# Patient Record
Sex: MALE | HISPANIC OR LATINO | Employment: FULL TIME | URBAN - METROPOLITAN AREA
[De-identification: names, ages, dates, MRNs, and addresses within clinical notes are randomized per-mention and may not be internally consistent; named-entity substitution may affect disease eponyms.]

---

## 2019-10-03 ENCOUNTER — OFFICE VISIT (OUTPATIENT)
Dept: FAMILY MEDICINE | Facility: CLINIC | Age: 24
End: 2019-10-03
Payer: COMMERCIAL

## 2019-10-03 VITALS
HEART RATE: 88 BPM | BODY MASS INDEX: 31.57 KG/M2 | SYSTOLIC BLOOD PRESSURE: 120 MMHG | WEIGHT: 208.3 LBS | HEIGHT: 68 IN | OXYGEN SATURATION: 96 % | TEMPERATURE: 99.3 F | DIASTOLIC BLOOD PRESSURE: 73 MMHG

## 2019-10-03 DIAGNOSIS — Z23 NEED FOR PROPHYLACTIC VACCINATION AND INOCULATION AGAINST INFLUENZA: ICD-10-CM

## 2019-10-03 DIAGNOSIS — Z13.220 LIPID SCREENING: ICD-10-CM

## 2019-10-03 DIAGNOSIS — E66.811 CLASS 1 OBESITY WITHOUT SERIOUS COMORBIDITY WITH BODY MASS INDEX (BMI) OF 33.0 TO 33.9 IN ADULT, UNSPECIFIED OBESITY TYPE: ICD-10-CM

## 2019-10-03 DIAGNOSIS — Z02.5 SPORTS PHYSICAL: ICD-10-CM

## 2019-10-03 DIAGNOSIS — Z00.00 ROUTINE HISTORY AND PHYSICAL EXAMINATION OF ADULT: Primary | ICD-10-CM

## 2019-10-03 PROCEDURE — 90686 IIV4 VACC NO PRSV 0.5 ML IM: CPT | Performed by: INTERNAL MEDICINE

## 2019-10-03 PROCEDURE — 99385 PREV VISIT NEW AGE 18-39: CPT | Mod: 25 | Performed by: INTERNAL MEDICINE

## 2019-10-03 PROCEDURE — 90471 IMMUNIZATION ADMIN: CPT | Performed by: INTERNAL MEDICINE

## 2019-10-03 PROCEDURE — 99213 OFFICE O/P EST LOW 20 MIN: CPT | Mod: 25 | Performed by: INTERNAL MEDICINE

## 2019-10-03 SDOH — HEALTH STABILITY: MENTAL HEALTH: HOW OFTEN DO YOU HAVE A DRINK CONTAINING ALCOHOL?: NEVER

## 2019-10-03 ASSESSMENT — MIFFLIN-ST. JEOR: SCORE: 1919.34

## 2019-10-03 NOTE — LETTER
[facility]     [address] [facility]  [address]  [place]  [address]      October 4, 2019    Jorge Mario Alberto Oliveira  [address]    [facility] [address]            To whom it may concern:    Mr. Oliveira was seen in our clinic on October 3rd 2019 for a physical exam      Sincerely,    Dr. Luis Angel Gaspar

## 2019-10-03 NOTE — PROGRESS NOTES
SUBJECTIVE:   CC: Yehuda Oliveira is an 24 year old male who presents for preventative health visit.   Healthy Habits:    Getting at least 3 servings of Calcium per day:  Yes    Bi-annual eye exam:  Yes    Dental care twice a year:  Yes    Sleep apnea or symptoms of sleep apnea:  None    Diet:  Regular (no restrictions)    Frequency of exercise:  2-3 days/week    Duration of exercise:  15-30 minutes    Taking medications regularly:  Not Applicable    Barriers to taking medications:  Not applicable    Medication side effects:  Not applicable    PHQ-2 Total Score:    Additional concerns today:  No        Today's PHQ-2 Score:   PHQ-2 ( 1999 [employer]) 10/3/2019   Q1: Little interest or pleasure in doing things 0   Q2: Feeling down, depressed or hopeless 0   PHQ-2 Score 0       Abuse: Current or Past(Physical, Sexual or Emotional)- No  Do you feel safe in your environment? Yes    Social History     Tobacco Use     Smoking status: Never Smoker     Smokeless tobacco: Never Used   Substance Use Topics     Alcohol use: Never     Frequency: Never     Comment: social     If you drink alcohol do you typically have >3 drinks per day or >7 drinks per week? Never drinks alcohol    No flowsheet data found.    Last PSA: No results found for: PSA    Reviewed orders with patient. Reviewed health maintenance and updated orders accordingly - Yes  Lab work is in process  Labs reviewed in EPIC  BP Readings from Last 3 Encounters:   10/03/19 120/73    Wt Readings from Last 3 Encounters:   10/03/19 94.5 kg (208 lb 4.8 oz)                  Patient Active Problem List   Diagnosis     Class 1 obesity without serious comorbidity with body mass index (BMI) of 33.0 to 33.9 in adult, unspecified obesity type     History reviewed. No pertinent surgical history.    Social History     Tobacco Use     Smoking status: Never Smoker     Smokeless tobacco: Never Used   Substance Use Topics     Alcohol use: Never     Frequency: Never     Comment:  "social     History reviewed. No pertinent family history.      No current outpatient medications on file.     No Known Allergies  No lab results found.     Reviewed and updated as needed this visit by clinical staff  Tobacco  Allergies  Meds  Problems  Med Hx  Surg Hx  Fam Hx  Soc Hx          Reviewed and updated as needed this visit by Provider  Allergies  Meds  Problems  Med Hx  Surg Hx  Fam Hx        History reviewed. No pertinent past medical history.   History reviewed. No pertinent surgical history.    Review of Systems  CONSTITUTIONAL: NEGATIVE for fever, chills, change in weight.  BMI in obesity category.  INTEGUMENTARY/SKIN: NEGATIVE for worrisome rashes, moles or lesions  EYES: NEGATIVE for vision changes or irritation  ENT: NEGATIVE for ear, mouth and throat problems  RESP: NEGATIVE for significant cough or SOB. No apnea that patient is aware of . No history of asthma as per patient.  CV: NEGATIVE for chest pain, palpitations or peripheral edema  GI: NEGATIVE for nausea, abdominal pain, heartburn, or change in bowel habits   male: negative for dysuria, hematuria, decreased urinary stream, erectile dysfunction, urethral discharge  MUSCULOSKELETAL: NEGATIVE for significant arthralgias or myalgia  NEURO: NEGATIVE for weakness, dizziness or paresthesias  ENDOCRINE: NEGATIVE for temperature intolerance, skin/hair changes  HEME/ALLERGY/IMMUNE: NEGATIVE for bleeding problems  PSYCHIATRIC: NEGATIVE for changes in mood or affect    OBJECTIVE:   /73 (BP Location: Right arm, Patient Position: Sitting, Cuff Size: Adult Regular)   Pulse 88   Temp 99.3  F (37.4  C) (Tympanic)   Ht 1.727 m (5' 8\")   Wt 94.5 kg (208 lb 4.8 oz)   SpO2 96%   BMI 31.67 kg/m      Physical Exam  GENERAL: healthy, alert and no distress, obese.  EYES: Eyes grossly normal to inspection, PERRL and conjunctivae and sclerae normal  HENT: ear canals and TM's normal, nose and mouth without ulcers or lesions  NECK: no " adenopathy, no asymmetry, masses, or scars and thyroid normal to palpation  RESP: lungs clear to auscultation - no rales, rhonchi or wheezes  CV: regular rate and rhythm, normal S1 S2, no S3 or S4, no murmur, click or rub, no peripheral edema and peripheral pulses strong  ABDOMEN: soft, nontender, no hepatosplenomegaly, no masses and bowel sounds normal  MS: no gross musculoskeletal defects noted, no edema. No scoliosis, no Joint deformities.  SKIN: no suspicious lesions or rashes and large pigmented nevus on proximal right arm without joint involvement. No striae. NO ACANTHOSIS  NEURO: Normal strength and tone, mentation intact and speech normal. Negative cerebellar signs.  PSYCH: mentation appears normal, affect normal, good eye contact.    Diagnostic Test Results:  Labs reviewed in Epic    ASSESSMENT/PLAN:   Yehuda was seen today for physical, imm/inj and imm/inj.    Diagnoses and all orders for this visit:    Routine history and physical examination of adult  Comments:  Preventative physical exam  Orders:  -     Cancel: Lipid panel reflex to direct LDL Fasting  -     Cancel: CBC with platelets  -     **CBC with platelets FUTURE 1yr; Future    Class 1 obesity without serious comorbidity with body mass index (BMI) of 33.0 to 33.9 in adult, unspecified obesity type  Comments:  Concern for Physical deconditioning  Orders:  -     Cancel: Comprehensive metabolic panel (BMP + Alb, Alk Phos, ALT, AST, Total. Bili, TP)  -     Cancel: TSH  -     **CBC with platelets FUTURE 1yr; Future    Need for prophylactic vaccination and inoculation against influenza  -     INFLUENZA VACCINE IM > 6 MONTHS VALENT IIV4 [80332]  -     Vaccine Administration, Initial [[id number]]    Lipid screening  -     Cancel: Lipid panel reflex to direct LDL Fasting    Sports physical  Comments:  Referral to Sports Medicine  Orders:  -     SPORTS MEDICINE REFERRAL; Future    At end of visit patient requested that we complete a Form which is outside the  characteristics of this visit (which is a preventive physical exam); Form is from [facility] referenced as The Form for 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  After reviewing the Form and requirements; advised the patient that this visit is scheduled as a physical preventative visit which is outside the characteristics of such an evaluation for enrollment into this program, AND the patient should undergo some formal of standardized Physical Fittness Testing and/or Physical Abilities Testing (PAT) , That is a form of Police Physical Abilities testing that is commonly required for applicants enrolling in Law Enforcement, such physical abilities testing includes evaluation such as 1.5 mile run, the police obstacle course, strengths assessment ..etc,  Advised patient that it required to PASS such a standardized physical fitness test. Some departments use the physical ability test (PAT), which includes as mentioned testing fitness in events such as running, push-ups, sit-ups, and sit and reach. Others might use the physical qualification test (PQT) which typically involves more of an obstacle course graded challenge that measures a wide range of physical abilities necessary of police work.    Regardless of which test he undergoes or the department [he is applying to] at [facility] uses as the standard, basic physical fitness must be displayed by a candidate. Exercises such as sprinting, running (two miles or more), vertical jumps, and strength training are highly recommended to become familiar and comfortable with. Should patient have passed such testing, he should bring results of such tests to the office for this provider to review . Again advised patient that such a Formal Physical Fitness testing or evaluation is  beyond the characteristics of this preventative visit and that this provider does not do such an evaluation.  We advised patient to call the [facility] [employer] [facility] [employer] [facility] and or see Sports Medicine (referral was put)  and ask if they can do such an assessment / evaluation or check with his College he is applying to, if they have a certain Program that will assess the students Physical Abilities Testing prior to enrollment. Also suggested to have him evaluated by Physical Therapy / occupational therapy to see if they do such an assessment [we can put a referral if patient wishes to].     I am concerned that the patient BMI 34 is in the obesity category and that he does not indulge in adequate/appropriate physical/exercise activities and possibility of some physical deconditioning.  Advised patient that part of his preventative physical exam, I recommend he does further  fasting blood tests/labs including lipid panel, fasting blood sugar, liver function tests, and thyroid test. Counseled patient on importance of life style changes, increase exercise activities and importance of weight loss and Healthy diet.    The Form for Professional Licensing CORE Program in Law Enforcement/Physician's Approval Form/for [facility] states if applicant is admitted to the program, the student will be required to participate in vigorous physical activities which include running and jumping over, under and around obstacles, as well as practicing a variety of prisoner restraint techniques. Also The student will be involved in technical aerobics, strength training, flexibility movements and climbing activities.  The student will participate in a number of simulated police activities including firing semi-auto pistols distals and shotguns, driving evasively, running, chasing and wrestling with resisting prisoners, standing for long periods of time, and lifting heavy objects. The students also will be exposed to  "chemical mace and outdoor elements and will be simulated with a T.A.S.E.R.  [place] law requires that [facility] NOT admit into the law enforcement program individuals who  pose a serious threat to the health or safety of themselves or others. Because of this requirement you have been asked to evaluate the physical fitness of the applicant to make a recommendation regarding their physical fitness for the program.  Form requires physician attests that nothing in the exam results indicate that the applicant participation in the group program will pose a serious threat to the health and safety of the applicant or other students and staff of the program.  That the applicant is physically fit to safely participate in the low enforcement program or is not physically capable of participation in low enforcement program with comments.        COUNSELING:   Reviewed preventive health counseling, as reflected in patient instructions       Regular exercise       Healthy diet/nutrition       Vision screening       Hearing screening       Immunizations -flu       Safe sex practices/STD prevention       HIV screeninx in teen years, 1x in adult years, and at intervals if high risk    Estimated body mass index is 31.67 kg/m  as calculated from the following:    Height as of this encounter: 1.727 m (5' 8\").    Weight as of this encounter: 94.5 kg (208 lb 4.8 oz).     Weight management plan: Discussed healthy diet and exercise guidelines     reports that he has never smoked. He has never used smokeless tobacco.      Counseling Resources:  ATP IV Guidelines  Pooled Cohorts Equation Calculator  FRAX Risk Assessment  ICSI Preventive Guidelines  Dietary Guidelines for Americans,   USDA's MyPlate  ASA Prophylaxis  Lung CA Screening    Luis Angel Gaspar MD  [facility]  "

## 2019-10-04 DIAGNOSIS — Z00.00 ROUTINE HISTORY AND PHYSICAL EXAMINATION OF ADULT: ICD-10-CM

## 2019-10-04 DIAGNOSIS — Z13.220 LIPID SCREENING: Primary | ICD-10-CM

## 2019-10-04 DIAGNOSIS — E66.811 CLASS 1 OBESITY WITHOUT SERIOUS COMORBIDITY WITH BODY MASS INDEX (BMI) OF 33.0 TO 33.9 IN ADULT, UNSPECIFIED OBESITY TYPE: ICD-10-CM

## 2024-03-26 ENCOUNTER — OFFICE VISIT (OUTPATIENT)
Dept: FAMILY MEDICINE | Facility: CLINIC | Age: 29
End: 2024-03-26
Payer: COMMERCIAL

## 2024-03-26 VITALS
SYSTOLIC BLOOD PRESSURE: 124 MMHG | HEART RATE: 78 BPM | RESPIRATION RATE: 16 BRPM | WEIGHT: 238.3 LBS | OXYGEN SATURATION: 100 % | BODY MASS INDEX: 36.12 KG/M2 | HEIGHT: 68 IN | TEMPERATURE: 97.8 F | DIASTOLIC BLOOD PRESSURE: 83 MMHG

## 2024-03-26 DIAGNOSIS — R21 RASH OF NECK: ICD-10-CM

## 2024-03-26 DIAGNOSIS — L91.8 SKIN TAG: Primary | ICD-10-CM

## 2024-03-26 PROCEDURE — 99203 OFFICE O/P NEW LOW 30 MIN: CPT | Performed by: GENERAL PRACTICE

## 2024-03-26 RX ORDER — CLINDAMYCIN PHOSPHATE 10 MG/G
GEL TOPICAL
Qty: 75 ML | Refills: 1 | Status: SHIPPED | OUTPATIENT
Start: 2024-03-26

## 2024-03-26 ASSESSMENT — PAIN SCALES - GENERAL: PAINLEVEL: NO PAIN (0)

## 2024-03-26 NOTE — PROGRESS NOTES
"  Assessment & Plan     Skin tag  Chronic and would like removal including one on the right cheek  - Adult Dermatology  Referral; Future    Rash of neck  Possible folliculitis, no pain or pruritus  Rule out diabetes  - Hemoglobin A1c; Future  - Comprehensive metabolic panel (BMP + Alb, Alk Phos, ALT, AST, Total. Bili, TP); Future  - clindamycin (CLINDAGEL) 1 % external gel; Apply twice daily to affected area for 7 days            BMI  Estimated body mass index is 36.23 kg/m  as calculated from the following:    Height as of this encounter: 1.727 m (5' 8\").    Weight as of this encounter: 108.1 kg (238 lb 4.8 oz).   Weight management plan: Discussed healthy diet and exercise guidelines          Omaira Blackman is a 29 year old, presenting for the following health issues:  Rash (On back of head down neck area ) and Skin Tags (Neck and next to right eye )        3/26/2024     8:49 AM   Additional Questions   Roomed by Echo ZALDIVAR MA   Accompanied by self         3/26/2024     8:49 AM   Patient Reported Additional Medications   Patient reports taking the following new medications none       Rash on the back of the neck    Several skin tags    History of Present Illness       Reason for visit:  General check    He eats 0-1 servings of fruits and vegetables daily.He consumes 3 sweetened beverage(s) daily.He exercises with enough effort to increase his heart rate 9 or less minutes per day.  He exercises with enough effort to increase his heart rate 3 or less days per week.   He is taking medications regularly.         Skin tag concerns on neck and next to right eye     Rash  Onset/Duration: 2-3 months   Description  Location: back of head down neck area   Character: blotchy, raised, red  Itching: no  Intensity:  mild  Progression of Symptoms:  same  Accompanying signs and symptoms:   Fever: No  Body aches or joint pain: No  Sore throat symptoms: No  Recent cold symptoms: No  History:           Previous episodes of " "similar rash: None  New exposures:  None  Recent travel: No  Exposure to similar rash: No  Precipitating or alleviating factors: none  Therapies tried and outcome: none        Review of Systems  Constitutional, HEENT, cardiovascular, pulmonary, gi and gu systems are negative, except as otherwise noted.      Objective    /83 (BP Location: Right arm, Patient Position: Sitting, Cuff Size: Adult Large)   Pulse 78   Temp 97.8  F (36.6  C) (Oral)   Resp 16   Ht 1.727 m (5' 8\")   Wt 108.1 kg (238 lb 4.8 oz)   SpO2 100%   BMI 36.23 kg/m    Body mass index is 36.23 kg/m .  Physical Exam  Constitutional:       Appearance: Normal appearance.   Eyes:      Extraocular Movements: Extraocular movements intact.   Neck:      Comments: Several small skin tags on the R neck and one small skin tag on the R cheek.  Cardiovascular:      Rate and Rhythm: Normal rate and regular rhythm.   Pulmonary:      Effort: Pulmonary effort is normal.      Breath sounds: Normal breath sounds.   Abdominal:      Palpations: Abdomen is soft.   Musculoskeletal:         General: Normal range of motion.      Cervical back: Normal range of motion.   Skin:     General: Skin is warm.      Comments: Posterior neck: bumps with hair in the middle  No redness   Neurological:      General: No focal deficit present.      Mental Status: He is alert and oriented to person, place, and time. Mental status is at baseline.   Psychiatric:         Mood and Affect: Mood normal.         Behavior: Behavior normal.         Thought Content: Thought content normal.         Judgment: Judgment normal.                    Signed Electronically by: Estefany Mendosa MD    "

## 2024-09-22 ENCOUNTER — HEALTH MAINTENANCE LETTER (OUTPATIENT)
Age: 29
End: 2024-09-22